# Patient Record
Sex: FEMALE | Race: WHITE | NOT HISPANIC OR LATINO | Employment: OTHER | URBAN - METROPOLITAN AREA
[De-identification: names, ages, dates, MRNs, and addresses within clinical notes are randomized per-mention and may not be internally consistent; named-entity substitution may affect disease eponyms.]

---

## 2017-01-17 ENCOUNTER — ANESTHESIA EVENT (OUTPATIENT)
Dept: GASTROENTEROLOGY | Facility: HOSPITAL | Age: 62
End: 2017-01-17

## 2017-01-17 ENCOUNTER — ANESTHESIA (OUTPATIENT)
Dept: GASTROENTEROLOGY | Facility: HOSPITAL | Age: 62
End: 2017-01-17

## 2017-01-17 PROCEDURE — 25010000002 PROPOFOL 10 MG/ML EMULSION: Performed by: ANESTHESIOLOGY

## 2017-01-17 RX ORDER — LIDOCAINE HYDROCHLORIDE 20 MG/ML
INJECTION, SOLUTION INFILTRATION; PERINEURAL AS NEEDED
Status: DISCONTINUED | OUTPATIENT
Start: 2017-01-17 | End: 2017-01-17 | Stop reason: SURG

## 2017-01-17 RX ORDER — PROPOFOL 10 MG/ML
VIAL (ML) INTRAVENOUS CONTINUOUS PRN
Status: DISCONTINUED | OUTPATIENT
Start: 2017-01-17 | End: 2017-01-17 | Stop reason: SURG

## 2017-01-17 RX ORDER — PROPOFOL 10 MG/ML
VIAL (ML) INTRAVENOUS AS NEEDED
Status: DISCONTINUED | OUTPATIENT
Start: 2017-01-17 | End: 2017-01-17 | Stop reason: SURG

## 2017-01-17 RX ADMIN — PROPOFOL 40 MG: 10 INJECTION, EMULSION INTRAVENOUS at 15:35

## 2017-01-17 RX ADMIN — PROPOFOL 140 MCG/KG/MIN: 10 INJECTION, EMULSION INTRAVENOUS at 15:27

## 2017-01-17 RX ADMIN — PROPOFOL 90 MG: 10 INJECTION, EMULSION INTRAVENOUS at 15:26

## 2017-01-17 RX ADMIN — LIDOCAINE HYDROCHLORIDE 60 MG: 20 INJECTION, SOLUTION INFILTRATION; PERINEURAL at 15:27

## 2017-01-17 RX ADMIN — PROPOFOL 30 MG: 10 INJECTION, EMULSION INTRAVENOUS at 15:28

## 2017-01-17 NOTE — ANESTHESIA PREPROCEDURE EVALUATION
Anesthesia Evaluation      Airway   Mallampati: II  TM distance: <3 FB  possible difficult intubation  Dental - normal exam     Pulmonary    (+) asthma,   Cardiovascular - negative cardio ROS and normal exam    Rhythm: regular  Rate: normal    Neuro/Psych- negative ROS  GI/Hepatic/Renal/Endo    (+)  hiatal hernia, GERD,     Musculoskeletal     Abdominal    Substance History      OB/GYN          Other                             Anesthesia Plan    ASA 3     MAC

## 2017-01-24 ENCOUNTER — TELEPHONE (OUTPATIENT)
Dept: GASTROENTEROLOGY | Facility: CLINIC | Age: 62
End: 2017-01-24

## 2017-01-24 NOTE — TELEPHONE ENCOUNTER
Call to pt.  Advise per path results that polyp that was removed was not cancerous and not precancerous.  Advise per Dr Warren that recommend f/u c/s in 5 yrs, office f/u sooner as needed.  Pt verb understanding    Message to Giselle STEIN for 1/18/22 c/s recall.

## 2017-01-24 NOTE — TELEPHONE ENCOUNTER
----- Message from Edgar LOBATO MD sent at 1/20/2017  1:06 PM EST -----  Regarding: Biopsy results  Okay to call results, recommend follow-up colonoscopy in 5 years, office follow up sooner as needed.  ----- Message -----     From: Lab, Background User     Sent: 1/19/2017   1:51 PM       To: Edgar LOBATO MD

## 2017-04-10 ENCOUNTER — OFFICE VISIT (OUTPATIENT)
Dept: FAMILY MEDICINE CLINIC | Facility: CLINIC | Age: 62
End: 2017-04-10

## 2017-04-10 VITALS
BODY MASS INDEX: 35.51 KG/M2 | DIASTOLIC BLOOD PRESSURE: 70 MMHG | WEIGHT: 193 LBS | RESPIRATION RATE: 16 BRPM | SYSTOLIC BLOOD PRESSURE: 110 MMHG | HEIGHT: 62 IN | OXYGEN SATURATION: 96 % | HEART RATE: 94 BPM

## 2017-04-10 DIAGNOSIS — M79.671 RIGHT FOOT PAIN: Primary | ICD-10-CM

## 2017-04-10 PROCEDURE — 99213 OFFICE O/P EST LOW 20 MIN: CPT | Performed by: FAMILY MEDICINE

## 2017-04-10 PROCEDURE — 73630 X-RAY EXAM OF FOOT: CPT | Performed by: FAMILY MEDICINE

## 2017-04-10 NOTE — PROGRESS NOTES
Subjective   Ignacia Tovar is a 61 y.o. female presenting with   Chief Complaint   Patient presents with   • Foot Pain     right foot pain  states it is the top and side of her right foot         HPI Comments: 61-year-old  white female nonsmoker says that she has had right foot pain for about a year but lately she has been working 9 hours per day 4 days a week and says that by the end of her shift she hurt so bad she can only go home and go to bed.  She went and bought the issue she could fine but says it doesn't really help much.  She denies any single incident of significant trauma in the past.       The following portions of the patient's history were reviewed and updated as appropriate: current medications, past family history, past medical history, past social history, past surgical history and problem list.    Review of Systems   Musculoskeletal: Positive for arthralgias and gait problem.   All other systems reviewed and are negative.      Objective   Physical Exam   Constitutional: She is oriented to person, place, and time. She appears well-developed and well-nourished.   HENT:   Head: Normocephalic and atraumatic.   Eyes: EOM are normal. Pupils are equal, round, and reactive to light.   Neck: Normal range of motion. Neck supple. No thyromegaly present.   Musculoskeletal: Normal range of motion. She exhibits tenderness. She exhibits no deformity.        Lymphadenopathy:     She has no cervical adenopathy.   Neurological: She is alert and oriented to person, place, and time. No cranial nerve deficit. Coordination normal.   Skin: Skin is warm and dry. She is not diaphoretic.   Psychiatric: She has a normal mood and affect. Her behavior is normal.   Nursing note and vitals reviewed.      Assessment/Plan   Ignacia was seen today for foot pain.    Diagnoses and all orders for this visit:    Right foot pain  -     XR Foot 3+ View Right (In Office)                   I would like him to return for another visit in  6 month(s)

## 2017-04-10 NOTE — PROGRESS NOTES
Procedure   Procedures        X Ray report    To further evaluate her complaint of significant chronic right foot pain I have requested an x-ray.  There are no old ones to compare this to.  2 out of 3 views of the foot appear to be normal, but one view appears to show a slightly displaced fracture of the proximal aspect of the third metatarsal.

## 2017-04-10 NOTE — PATIENT INSTRUCTIONS
This is a very nice 61-year-old with significant right foot pain.  2 out of 3 views of her foot looked normal, but one suggests there is a fracture of the proximal third metatarsal.  I will request a podiatry consult to further evaluate this.

## 2017-06-13 ENCOUNTER — OFFICE VISIT (OUTPATIENT)
Dept: OBSTETRICS AND GYNECOLOGY | Facility: CLINIC | Age: 62
End: 2017-06-13

## 2017-06-13 VITALS
WEIGHT: 197.2 LBS | DIASTOLIC BLOOD PRESSURE: 62 MMHG | HEIGHT: 62 IN | SYSTOLIC BLOOD PRESSURE: 130 MMHG | BODY MASS INDEX: 36.29 KG/M2

## 2017-06-13 DIAGNOSIS — Z13.820 SCREENING FOR OSTEOPOROSIS: ICD-10-CM

## 2017-06-13 DIAGNOSIS — Z01.411 ENCOUNTER FOR GYNECOLOGICAL EXAMINATION WITH ABNORMAL FINDING: ICD-10-CM

## 2017-06-13 DIAGNOSIS — N81.6 RECTOCELE: ICD-10-CM

## 2017-06-13 DIAGNOSIS — K46.9 ENTEROCELE: ICD-10-CM

## 2017-06-13 DIAGNOSIS — Z78.0 MENOPAUSE: Primary | ICD-10-CM

## 2017-06-13 PROCEDURE — 99386 PREV VISIT NEW AGE 40-64: CPT | Performed by: OBSTETRICS & GYNECOLOGY

## 2017-06-13 NOTE — PROGRESS NOTES
"   Chief Complaint   Patient presents with   • Gynecologic Exam       Ignacia Tovar is a 61 y.o. year old  presenting to be seen for a wellness exam as a new patient. This patient has not been seen in our office for years.  She had a vaginal hysterectomy unilateral salpingo-oophorectomy by Dr. Javed.  I did a unilateral salpingo-oophorectomy and laparoscopic Lyman procedure.  She has no urinary symptoms.  She has developed difficulty expelling stool and has rectal pressure.  She denies menopausal symptoms.    SCREENING TESTS    Year 2012 2016 2017 2018   Age                         PAP                         HPV high risk                         Mammogram      benign                   MEI score                         Breast MRI                         Lipids                         Vitamin D                         Colonoscopy                         DEXA  Frax (hip/any)      *                   Ovarian Screen                           Enter the month test was performed.  If month not known, enter \"X'  · Black numbers = normal results  · Red numbers = abnormal results  · Black X = patient reported normal  · Red X - patient reported abnormal      Referred by:    Profession:    Other info:        She exercises regularly: yes.  She wears her seat belt: yes.  She has concerns about domestic violence: no.  She has noticed changes in height: no    GYN screening history:  · Last mammogram: was done on approximately 2017 and the result was: Birads I (Normal)..    No Additional Complaints Reported    The following portions of the patient's history were reviewed and updated as appropriate:vital signs and   She  does not have any pertinent problems on file.  She  has a past surgical history that includes Back surgery; Bladder surgery; Breast surgery; Cholecystectomy; Tonsillectomy; Esophagus surgery (); Colonoscopy (N/A, " "1/17/2017); vaginal hysterectomy salpingo oophorectomy; Salpingoophorectomy; Laparoscopic Lyman procedure; and Rotator cuff repair (Right).  Her family history includes Bone cancer in her sister; Breast cancer in her sister; Colon cancer in her brother; Lung cancer in her brother and sister; Ovarian cancer in her sister.  She  reports that she has never smoked. She has never used smokeless tobacco. She reports that she drinks alcohol. She reports that she does not use illicit drugs.  No current outpatient prescriptions on file.     No current facility-administered medications for this visit.      She has No Known Allergies..    Review of Systems  A comprehensive review of systems was taken.  Constitutional: negative for fever, chills, activity change, appetite change, fatigue and unexpected weight change.  Respiratory: negative  Cardiovascular: negative  Gastrointestinal: positive for difficulty expelling stool and rectal pressure  Genitourinary:negative  Musculoskeletal:negative  Behavioral/Psych: negative       /62  Ht 62\" (157.5 cm)  Wt 197 lb 3.2 oz (89.4 kg)  LMP  (LMP Unknown)  BMI 36.07 kg/m2    Physical Exam    General:  alert; cooperative; well developed; well nourished  obese - Body mass index is 36.07 kg/(m^2).   Skin:  No suspicious lesions seen   Thyroid: normal to inspection and palpation   Lungs:  clear to auscultation bilaterally   Heart:  regular rate and rhythm, S1, S2 normal, no murmur, click, rub or gallop   Breasts:  Examined in supine position  Symmetric without masses or skin dimpling  Nipples normal without inversion, lesions or discharge  There are no palpable axillary nodes   Abdomen: soft, non-tender; no masses  no umbilical or inginual hernias are present  no hepato-splenomegaly   Pelvis: Clinical staff was present for exam  External genitalia:  normal appearance of the external genitalia including Bartholin's and [place]'s glands.  Vaginal:  normal pink mucosa without prolapse or " lesions.  Cervix:  absent.  Uterus:  absent.  Adnexa:  absent, bilateral.  Rectal:  anus visually normal appearing. recto-vaginal exam unremarkable and confirms findings;  Rectocele GRADE 2  Enterocele GRADE 2     Lab Review   No data reviewed    Imaging  Mammogram results- benign         ASSESSMENT  Problems Addressed this Visit        Digestive    Rectocele    Enterocele       Genitourinary    Menopause - Primary      Other Visit Diagnoses     Encounter for gynecological examination with abnormal finding        Relevant Orders    Liquid-based Pap Smear, Screening    Screening for osteoporosis        Relevant Orders    DEXA Bone Density Axial          PLAN    · Medications prescribed this encounter:  No orders of the defined types were placed in this encounter.  · Pap test of vagina done  · DEXA ordered  · I have had a 1 minute face-to-face discussion with this patient about her findings of a grade 2 rectocele and a grade 2 enterocele.  She is symptomatic.  I have counseled her about surgical repair with a posterior repair and enterocele repair.  She desires these procedures due to her symptoms.  She will make a decision for surgery and be scheduled as an outpatient.  The surgical risks of rectal injury, bowel injury, bleeding, infection, and anesthetic risks were explained to the patient.  She voiced understanding of these risks.  · Calcium, 600 mg/ Vit. D, 400 IU daily; regular weight-bearing exercise  · Follow up: 12 month(s)  *Please note that portions of this documentation may have been completed with a voice recognition program.  Efforts were made to edit this dictation, but occasional words may have been mistranscribed.       This note was electronically signed.    MICHELLE Meng MD  June 13, 2017  3:41 PM

## 2017-06-20 ENCOUNTER — TELEPHONE (OUTPATIENT)
Dept: OBSTETRICS AND GYNECOLOGY | Facility: CLINIC | Age: 62
End: 2017-06-20

## 2017-06-20 NOTE — TELEPHONE ENCOUNTER
Pt calling to reschedule her Posterior/Enterocele Repair at [facility] from 6/30 to 7/21/17. States her daughter has had knee surgery and she needs to be available to help with grandchildren for a bit longer.  We will talk soon to plan when she should see her PCP for Pre op labs and EKG. Deepika Harris in business office notified, [facility] ([place]) notified

## 2017-06-27 ENCOUNTER — HOSPITAL ENCOUNTER (OUTPATIENT)
Dept: BONE DENSITY | Facility: HOSPITAL | Age: 62
Discharge: HOME | End: 2017-06-27
Attending: OBSTETRICS & GYNECOLOGY | Admitting: OBSTETRICS & GYNECOLOGY

## 2017-06-27 DIAGNOSIS — Z13.820 SCREENING FOR OSTEOPOROSIS: ICD-10-CM

## 2017-06-27 PROCEDURE — 77080 DXA BONE DENSITY AXIAL: CPT

## 2017-06-27 PROCEDURE — 77080 DXA BONE DENSITY AXIAL: CPT | Performed by: RADIOLOGY

## 2017-07-11 ENCOUNTER — TELEPHONE (OUTPATIENT)
Dept: OBSTETRICS AND GYNECOLOGY | Facility: CLINIC | Age: 62
End: 2017-07-11

## 2017-07-13 ENCOUNTER — DOCUMENTATION (OUTPATIENT)
Dept: OBSTETRICS AND GYNECOLOGY | Facility: CLINIC | Age: 62
End: 2017-07-13

## 2017-07-13 ENCOUNTER — TELEPHONE (OUTPATIENT)
Dept: OBSTETRICS AND GYNECOLOGY | Facility: CLINIC | Age: 62
End: 2017-07-13

## 2017-07-13 NOTE — PROGRESS NOTES
Chief Complaint: Difficulty expelling stool  History of Present Illness: This patient was seen in the office on 2017 for a wellness examination with complaints of difficulty expelling stool.  She has previously had a vaginal hysterectomy unilateral salpingo-oophorectomy by Dr. Javed and a unilateral salpingo-oophorectomy and laparoscopic Lyman procedure by myself.  On examination she was found to have a grade 2 rectocele and a grade 2 enterocele.  She desires treatment of these defects with a posterior repair and enterocele repair to attempt to improve her difficulty expelling stool.  She has no incontinence of stool.  She has had no prior bowel surgery.  Ignacia Tovar is a 62 y.o., [id number]    Past Medical History:   Diagnosis Date   • Asthma    • Celiac disease    • Esophageal reflux    • Hiatal hernia    • Hyperlipidemia    • Prediabetes        Allergies: NKDA    Medications: None    Previous Surgery: 1) Diskectomy; 2) Cystoscopy; 3) T&A; 4) Cholecystectomy; 5) Breast biopsy; 6) VH, S&O; 7) Laparoscopic S&O, Lyman procedure; 8) Right rotator cuff surgery    Nicotine: None; Alcohol: Social; Drugs: None    Review of Systems  A comprehensive review of systems was negative.  Constitutional: negative for fever, chills, activity change, appetite change, fatigue and unexpected weight change.  Respiratory: negative  Cardiovascular: negative  Gastrointestinal: positive for difficulty expelling stool  Genitourinary:negative  Musculoskeletal:negative  Behavioral/Psych: negative      LMP  (LMP Unknown)    Physical Exam  General:  alert; cooperative; well developed; well nourished   Skin:  No suspicious lesions seen   Thyroid: normal to inspection and palpation   Lungs:  clear to auscultation bilaterally   Heart:  regular rate and rhythm, S1, S2 normal, no murmur, click, rub or gallop   Breasts:  Examined in supine position  Symmetric without masses or skin dimpling  Nipples normal without inversion, lesions or  discharge  There are no palpable axillary nodes   Abdomen: soft, non-tender; no masses  no umbilical or inginual hernias are present  no hepato-splenomegaly   Pelvis: Clinical staff was present for exam  External genitalia:  normal appearance of the external genitalia including Bartholin's and Day's glands.  Vaginal:  normal pink mucosa without prolapse or lesions.  Cervix:  absent.  Uterus:  absent.  Adnexa:  absent, bilateral.  Rectal:  anus visually normal appearing. recto-vaginal exam unremarkable and confirms findings;  Rectocele GRADE 2  Enterocele GRADE 2         Injury to bowel, Injury to rectum, Injury to bladder, Injury to ureter, bleeding, infection and anesthestic risks were explained to the patient and she voiced understanding. Informed consent was signed.    No contraindications to planned surgery were detected      Impression: 1) Rectocele grade II; (N81.6) 2) Enterocele grade II, (N81.5)        Plan: Posterior Repair, Enterocele Repair      *Please note that portions of this documentation may have been completed with a voice recognition program.  Efforts were made to edit this dictation, but occasional words may have been mistranscribed.  This note was electronically signed.    MICHELLE Meng MD  July 13, 2017  9:05 AM

## 2017-07-13 NOTE — TELEPHONE ENCOUNTER
Another attempt to reach pt to discuss Pre op bloodwork and bowel prep. Have left another msg for return call.

## 2017-07-17 ENCOUNTER — TELEPHONE (OUTPATIENT)
Dept: OBSTETRICS AND GYNECOLOGY | Facility: CLINIC | Age: 62
End: 2017-07-17

## 2017-07-17 NOTE — TELEPHONE ENCOUNTER
Pt returning my calls from last week. She states that she will need to cancel her Posterior Repair scheduled for 7/21 at [facility]. States her daughter has had complications with previous knee surgery and Hilary will be caring for her grandchildren until issues are resolved. She will call back later to reschedule.  Notified Deepika PIERCE in business office and [facility] ([place]).

## 2018-04-06 ENCOUNTER — OFFICE VISIT (OUTPATIENT)
Dept: FAMILY MEDICINE CLINIC | Facility: CLINIC | Age: 63
End: 2018-04-06

## 2018-04-06 VITALS
WEIGHT: 196 LBS | SYSTOLIC BLOOD PRESSURE: 122 MMHG | HEART RATE: 73 BPM | TEMPERATURE: 97.6 F | OXYGEN SATURATION: 98 % | DIASTOLIC BLOOD PRESSURE: 82 MMHG | BODY MASS INDEX: 35.85 KG/M2

## 2018-04-06 DIAGNOSIS — J40 BRONCHITIS: Primary | ICD-10-CM

## 2018-04-06 DIAGNOSIS — J01.00 ACUTE NON-RECURRENT MAXILLARY SINUSITIS: ICD-10-CM

## 2018-04-06 PROCEDURE — 99213 OFFICE O/P EST LOW 20 MIN: CPT | Performed by: FAMILY MEDICINE

## 2018-04-06 RX ORDER — AMOXICILLIN AND CLAVULANATE POTASSIUM 875; 125 MG/1; MG/1
1 TABLET, FILM COATED ORAL 2 TIMES DAILY
Qty: 14 TABLET | Refills: 0 | Status: SHIPPED | OUTPATIENT
Start: 2018-04-06 | End: 2018-06-01

## 2018-04-06 RX ORDER — CETIRIZINE HYDROCHLORIDE 10 MG/1
10 TABLET ORAL DAILY
COMMUNITY
End: 2021-01-14

## 2018-04-06 NOTE — PROGRESS NOTES
Subjective   Ignacia Tovar is a 62 y.o. female presenting with   Chief Complaint   Patient presents with   • possible pneumonia        This is a 62-year-old  white female nonsmoker who comes in today with the complaint that for the last week she has had right posterior chest pain that is very reminiscent of when she had pneumonia.  She does not have any leg swelling or hemoptysis or shortness of breath.  She also does not have any high fever or chills.    She says since the onset of this pain she has also developed a cough with some sinus pressure and rhinorrhea.  The cough is nonproductive and she does not have a severe headache or high fever or chills.         The following portions of the patient's history were reviewed and updated as appropriate: current medications, past family history, past medical history, past social history, past surgical history and problem list.    Review of Systems   HENT: Positive for congestion, postnasal drip and rhinorrhea.    Respiratory: Positive for cough.    All other systems reviewed and are negative.      Objective   Physical Exam   Constitutional: She is oriented to person, place, and time. She appears well-developed and well-nourished. No distress.   HENT:   Head: Normocephalic and atraumatic.   Right Ear: External ear normal.   Left Ear: External ear normal.   Nose: Mucosal edema and rhinorrhea present. Right sinus exhibits maxillary sinus tenderness. Left sinus exhibits maxillary sinus tenderness.   Mouth/Throat: Oropharynx is clear and moist.   Eyes: EOM are normal. Pupils are equal, round, and reactive to light.   Neck: Normal range of motion. Neck supple. No thyromegaly present.   Cardiovascular: Normal rate and regular rhythm.    Pulmonary/Chest: Effort normal. No respiratory distress. She has no wheezes. She has rhonchi in the right upper field and the left upper field. She has no rales.   Musculoskeletal: Normal range of motion. She exhibits no edema or  tenderness.   Lymphadenopathy:     She has no cervical adenopathy.   Neurological: She is alert and oriented to person, place, and time.   Skin: Skin is warm and dry. She is not diaphoretic.   Psychiatric: She has a normal mood and affect. Her behavior is normal.   Nursing note and vitals reviewed.      Assessment/Plan   Ada was seen today for possible pneumonia.    Diagnoses and all orders for this visit:    Bronchitis    Acute non-recurrent maxillary sinusitis    Other orders  -     amoxicillin-clavulanate (AUGMENTIN) 875-125 MG per tablet; Take 1 tablet by mouth 2 (Two) Times a Day.                   I would like him to return for another visit in 6 month(s)

## 2018-04-06 NOTE — PATIENT INSTRUCTIONS
This is a very nice 62-year-old with acute sinusitis and bronchitis.  I will send out Augmentin and would like her to continue her Zyrtec and Mucinex.

## 2018-06-01 ENCOUNTER — OFFICE VISIT (OUTPATIENT)
Dept: FAMILY MEDICINE CLINIC | Facility: CLINIC | Age: 63
End: 2018-06-01

## 2018-06-01 VITALS
WEIGHT: 191.7 LBS | DIASTOLIC BLOOD PRESSURE: 83 MMHG | SYSTOLIC BLOOD PRESSURE: 117 MMHG | TEMPERATURE: 97.7 F | BODY MASS INDEX: 35.28 KG/M2 | HEART RATE: 56 BPM | HEIGHT: 62 IN | OXYGEN SATURATION: 95 %

## 2018-06-01 DIAGNOSIS — J40 BRONCHITIS: Primary | ICD-10-CM

## 2018-06-01 PROCEDURE — 99213 OFFICE O/P EST LOW 20 MIN: CPT | Performed by: FAMILY MEDICINE

## 2018-06-01 RX ORDER — AMOXICILLIN AND CLAVULANATE POTASSIUM 875; 125 MG/1; MG/1
1 TABLET, FILM COATED ORAL 2 TIMES DAILY
Qty: 14 TABLET | Refills: 0 | Status: SHIPPED | OUTPATIENT
Start: 2018-06-01 | End: 2019-02-06

## 2018-06-01 NOTE — PROGRESS NOTES
Subjective   Ignacia Tovar is a 62 y.o. female presenting with   Chief Complaint   Patient presents with   • URI     possible bronchitis        She played out in the rain 5 days ago with her grand children and started getting sick.  She says now she is continuously coughing and it is productive of yellowish sputum.  She does not have any chest pain or shortness of breath.  She is not wheezing.  She does have some sinus drainage as well.  None of the grandchildren are sick.      URI    Associated symptoms include congestion, coughing and rhinorrhea.        The following portions of the patient's history were reviewed and updated as appropriate: current medications, past family history, past medical history, past social history, past surgical history and problem list.    Review of Systems   HENT: Positive for congestion and rhinorrhea.    Respiratory: Positive for cough.    All other systems reviewed and are negative.      Objective   Physical Exam   Constitutional: She is oriented to person, place, and time. She appears well-developed and well-nourished. No distress.   HENT:   Head: Normocephalic and atraumatic.   Nose: Mucosal edema and rhinorrhea present.   Eyes: EOM are normal. Pupils are equal, round, and reactive to light.   Neck: Normal range of motion. Neck supple. No thyromegaly present.   Cardiovascular: Normal rate and regular rhythm.    Pulmonary/Chest: Effort normal. No respiratory distress. She has no wheezes. She has rhonchi in the right upper field and the left upper field. She has no rales.   Musculoskeletal: Normal range of motion. She exhibits no edema or tenderness.   Lymphadenopathy:     She has no cervical adenopathy.   Neurological: She is alert and oriented to person, place, and time.   Skin: Skin is warm and dry. She is not diaphoretic.   Psychiatric: She has a normal mood and affect. Her behavior is normal.   Nursing note and vitals reviewed.      Assessment/Plan   Ignacia was seen today for  joe.    Diagnoses and all orders for this visit:    Bronchitis    Other orders  -     amoxicillin-clavulanate (AUGMENTIN) 875-125 MG per tablet; Take 1 tablet by mouth 2 (Two) Times a Day.                   I would like him to return for another visit in 6 month(s)

## 2018-06-01 NOTE — PATIENT INSTRUCTIONS
This is a very nice 62-year-old who is here for deep cough and chest congestion.  I would like her to call Monday if she is not starting to feel better.

## 2019-02-06 ENCOUNTER — OFFICE VISIT (OUTPATIENT)
Dept: FAMILY MEDICINE CLINIC | Facility: CLINIC | Age: 64
End: 2019-02-06

## 2019-02-06 VITALS
TEMPERATURE: 98.3 F | BODY MASS INDEX: 32.52 KG/M2 | DIASTOLIC BLOOD PRESSURE: 78 MMHG | SYSTOLIC BLOOD PRESSURE: 139 MMHG | WEIGHT: 183.6 LBS | HEART RATE: 89 BPM | OXYGEN SATURATION: 95 %

## 2019-02-06 DIAGNOSIS — J01.10 ACUTE FRONTAL SINUSITIS, RECURRENCE NOT SPECIFIED: Primary | ICD-10-CM

## 2019-02-06 PROCEDURE — 99214 OFFICE O/P EST MOD 30 MIN: CPT | Performed by: NURSE PRACTITIONER

## 2019-02-06 RX ORDER — AMOXICILLIN AND CLAVULANATE POTASSIUM 875; 125 MG/1; MG/1
1 TABLET, FILM COATED ORAL 2 TIMES DAILY
Qty: 14 TABLET | Refills: 0 | Status: SHIPPED | OUTPATIENT
Start: 2019-02-06 | End: 2021-01-14

## 2019-02-06 RX ORDER — BENZONATATE 200 MG/1
200 CAPSULE ORAL 2 TIMES DAILY PRN
Qty: 30 CAPSULE | Refills: 0 | Status: SHIPPED | OUTPATIENT
Start: 2019-02-06 | End: 2021-01-14

## 2019-02-06 NOTE — PROGRESS NOTES
Subjective   Ignacia Tovar is a 63 y.o. female.     Chief Complaint   Patient presents with   • Sinus Problem     green drainage is taking musinex   • Cough      HPI  Patient is new to me.  She is here for sinus pain and pressure b/l forehead since this past Saturday which is worsening since yesterday.  Headache associated across her forehead is worsening and has not been relieved with OTC meds.  Has cough for same duration and sometimes is able to bring up some green sputum.  Has been taking mucinex but doesn't seem to be helping after initial improvement.  Cough has been dry at night and so frequent that she did not get much sleep last night. Usually gets sinusitis that gets so bad she eventually gets pneumonia unless it is treated early with abx.  Also, has some questions about weight loss.  She wants info on Contrave which her nephew with diabetes is taking and is now no longer diabetic.  She wonders if she needs to be on this to prevent diabetes.  Walks frequently in her job and always gets at least 10,000 steps in.      Social History     Tobacco Use   • Smoking status: Never Smoker   • Smokeless tobacco: Never Used   Substance Use Topics   • Alcohol use: Yes     Comment: social   • Drug use: No       The following portions of the patient's history were reviewed and updated as appropriate: allergies, current medications, past family history, past medical history, past social history, past surgical history and problem list.    Review of Systems   Constitutional: Positive for fatigue (just started in last few days). Negative for chills and fever.   HENT: Positive for congestion, ear pain (right ear feels full), sinus pressure and sinus pain. Negative for facial swelling, postnasal drip, rhinorrhea and sore throat.    Eyes: Negative for itching.   Respiratory: Positive for cough. Negative for chest tightness, shortness of breath and wheezing.    Cardiovascular: Negative for chest pain and palpitations.    Gastrointestinal: Negative for abdominal pain, diarrhea, nausea and vomiting.   Musculoskeletal: Negative for arthralgias and myalgias.   Skin: Negative for rash.   Allergic/Immunologic: Positive for environmental allergies (controlled with zyrtec).   Neurological: Negative for dizziness and weakness.       Objective   Blood pressure 139/78, pulse 89, temperature 98.3 °F (36.8 °C), weight 83.3 kg (183 lb 9.6 oz), SpO2 95 %, not currently breastfeeding.    Physical Exam   Constitutional: She is oriented to person, place, and time. She appears well-developed and well-nourished.   HENT:   Head: Normocephalic and atraumatic.   Right Ear: External ear and ear canal normal. Tympanic membrane is not erythematous. A middle ear effusion is present.   Left Ear: External ear and ear canal normal. Tympanic membrane is not erythematous.   Nose: Mucosal edema and rhinorrhea present. Right sinus exhibits frontal sinus tenderness. Left sinus exhibits frontal sinus tenderness.   Mouth/Throat: Oropharynx is clear and moist. No oropharyngeal exudate.   Eyes: Conjunctivae are normal. Right eye exhibits no discharge. Left eye exhibits no discharge.   Neck: Neck supple. No tracheal deviation present.   Cardiovascular: Normal rate, regular rhythm, normal heart sounds and intact distal pulses.   Pulmonary/Chest: Effort normal and breath sounds normal. She has no wheezes. She has no rales.   Abdominal: Soft. Bowel sounds are normal. There is no tenderness.   Musculoskeletal: She exhibits no deformity.   Gait smooth and steady   Lymphadenopathy:     She has no cervical adenopathy.   Neurological: She is alert and oriented to person, place, and time. No cranial nerve deficit.   Skin: Skin is warm and dry. No rash noted.   Psychiatric: She has a normal mood and affect.   Nursing note and vitals reviewed.      Assessment   Problem List Items Addressed This Visit     None      Visit Diagnoses     Acute frontal sinusitis, recurrence not  specified    -  Primary    BMI 32.0-32.9,adult               Procedures           Impression and Plan:  Augmentin for sinusitis.  Tessalon for cough.  Use flonase BID x 1 week, then daily. Continue zyrtec.   BMI:  We reviewed her chart and it looks like she is doing a great job on weight loss.  She has been slowly and steadily losing weight.  I recommend she continue getting her 10,000 steps daily and follow a sensible diet as she has been doing.  I dont think she needs a weight loss medication at this time and pt is agreeable with this.        Health Maintenance Due   Topic Date Due   • ANNUAL PHYSICAL  06/14/1958   • TDAP/TD VACCINES (1 - Tdap) 06/14/1974   • ZOSTER VACCINE (1 of 2) 06/14/2005   • HEPATITIS C SCREENING  08/11/2016   • LIPID PANEL  08/11/2016   • INFLUENZA VACCINE  08/01/2018

## 2021-03-19 ENCOUNTER — BULK ORDERING (OUTPATIENT)
Dept: CASE MANAGEMENT | Facility: OTHER | Age: 66
End: 2021-03-19

## 2021-03-19 DIAGNOSIS — Z23 IMMUNIZATION DUE: ICD-10-CM

## 2021-04-14 ENCOUNTER — APPOINTMENT (OUTPATIENT)
Dept: GENERAL RADIOLOGY | Facility: HOSPITAL | Age: 66
End: 2021-04-14

## 2021-04-14 ENCOUNTER — HOSPITAL ENCOUNTER (EMERGENCY)
Facility: HOSPITAL | Age: 66
Discharge: HOME | End: 2021-04-14
Attending: EMERGENCY MEDICINE | Admitting: EMERGENCY MEDICINE

## 2021-04-14 VITALS
OXYGEN SATURATION: 100 % | HEIGHT: 62 IN | SYSTOLIC BLOOD PRESSURE: 133 MMHG | BODY MASS INDEX: 34.6 KG/M2 | HEART RATE: 88 BPM | DIASTOLIC BLOOD PRESSURE: 77 MMHG | RESPIRATION RATE: 18 BRPM | TEMPERATURE: 96.3 F | WEIGHT: 188 LBS

## 2021-04-14 DIAGNOSIS — U07.1 COVID-19: Primary | ICD-10-CM

## 2021-04-14 LAB
ALBUMIN SERPL-MCNC: 3.6 G/DL (ref 3.5–5.2)
ALBUMIN/GLOB SERPL: 1.4 G/DL
ALP SERPL-CCNC: 65 U/L (ref 39–117)
ALT SERPL W P-5'-P-CCNC: 23 U/L (ref 1–33)
ANION GAP SERPL CALCULATED.3IONS-SCNC: 9.6 MMOL/L (ref 5–15)
AST SERPL-CCNC: 14 U/L (ref 1–32)
B PARAPERT DNA SPEC QL NAA+PROBE: NOT DETECTED
B PERT DNA SPEC QL NAA+PROBE: NOT DETECTED
BASOPHILS # BLD AUTO: 0.08 10*3/MM3 (ref 0–0.2)
BASOPHILS NFR BLD AUTO: 0.8 % (ref 0–1.5)
BILIRUB SERPL-MCNC: <0.2 MG/DL (ref 0–1.2)
BUN SERPL-MCNC: 21 MG/DL (ref 8–23)
BUN/CREAT SERPL: 28.4 (ref 7–25)
C PNEUM DNA NPH QL NAA+NON-PROBE: NOT DETECTED
CALCIUM SPEC-SCNC: 8.4 MG/DL (ref 8.6–10.5)
CHLORIDE SERPL-SCNC: 100 MMOL/L (ref 98–107)
CO2 SERPL-SCNC: 27.4 MMOL/L (ref 22–29)
CREAT SERPL-MCNC: 0.74 MG/DL (ref 0.57–1)
DEPRECATED RDW RBC AUTO: 42.3 FL (ref 37–54)
EOSINOPHIL # BLD AUTO: 0.24 10*3/MM3 (ref 0–0.4)
EOSINOPHIL NFR BLD AUTO: 2.3 % (ref 0.3–6.2)
ERYTHROCYTE [DISTWIDTH] IN BLOOD BY AUTOMATED COUNT: 14.9 % (ref 12.3–15.4)
FLUAV SUBTYP SPEC NAA+PROBE: NOT DETECTED
FLUBV RNA ISLT QL NAA+PROBE: NOT DETECTED
GFR SERPL CREATININE-BSD FRML MDRD: 79 ML/MIN/1.73
GLOBULIN UR ELPH-MCNC: 2.6 GM/DL
GLUCOSE SERPL-MCNC: 127 MG/DL (ref 65–99)
HADV DNA SPEC NAA+PROBE: NOT DETECTED
HCOV 229E RNA SPEC QL NAA+PROBE: NOT DETECTED
HCOV HKU1 RNA SPEC QL NAA+PROBE: NOT DETECTED
HCOV NL63 RNA SPEC QL NAA+PROBE: NOT DETECTED
HCOV OC43 RNA SPEC QL NAA+PROBE: NOT DETECTED
HCT VFR BLD AUTO: 40.7 % (ref 34–46.6)
HGB BLD-MCNC: 12.9 G/DL (ref 12–15.9)
HMPV RNA NPH QL NAA+NON-PROBE: NOT DETECTED
HOLD SPECIMEN: NORMAL
HOLD SPECIMEN: NORMAL
HPIV1 RNA SPEC QL NAA+PROBE: NOT DETECTED
HPIV2 RNA SPEC QL NAA+PROBE: NOT DETECTED
HPIV3 RNA NPH QL NAA+PROBE: NOT DETECTED
HPIV4 P GENE NPH QL NAA+PROBE: NOT DETECTED
IMM GRANULOCYTES # BLD AUTO: 0.06 10*3/MM3 (ref 0–0.05)
IMM GRANULOCYTES NFR BLD AUTO: 0.6 % (ref 0–0.5)
LYMPHOCYTES # BLD AUTO: 2.96 10*3/MM3 (ref 0.7–3.1)
LYMPHOCYTES NFR BLD AUTO: 28 % (ref 19.6–45.3)
M PNEUMO IGG SER IA-ACNC: NOT DETECTED
MCH RBC QN AUTO: 24.9 PG (ref 26.6–33)
MCHC RBC AUTO-ENTMCNC: 31.7 G/DL (ref 31.5–35.7)
MCV RBC AUTO: 78.4 FL (ref 79–97)
MONOCYTES # BLD AUTO: 0.84 10*3/MM3 (ref 0.1–0.9)
MONOCYTES NFR BLD AUTO: 7.9 % (ref 5–12)
NEUTROPHILS NFR BLD AUTO: 6.4 10*3/MM3 (ref 1.7–7)
NEUTROPHILS NFR BLD AUTO: 60.4 % (ref 42.7–76)
NRBC BLD AUTO-RTO: 0 /100 WBC (ref 0–0.2)
NT-PROBNP SERPL-MCNC: 52.4 PG/ML (ref 0–900)
PLATELET # BLD AUTO: 534 10*3/MM3 (ref 140–450)
PMV BLD AUTO: 9.5 FL (ref 6–12)
POTASSIUM SERPL-SCNC: 4.3 MMOL/L (ref 3.5–5.2)
PROT SERPL-MCNC: 6.2 G/DL (ref 6–8.5)
QT INTERVAL: 430 MS
RBC # BLD AUTO: 5.19 10*6/MM3 (ref 3.77–5.28)
RHINOVIRUS RNA SPEC NAA+PROBE: NOT DETECTED
RSV RNA NPH QL NAA+NON-PROBE: NOT DETECTED
SARS-COV-2 RNA NPH QL NAA+NON-PROBE: DETECTED
SODIUM SERPL-SCNC: 137 MMOL/L (ref 136–145)
TROPONIN T SERPL-MCNC: <0.01 NG/ML (ref 0–0.03)
WBC # BLD AUTO: 10.58 10*3/MM3 (ref 3.4–10.8)
WHOLE BLOOD HOLD SPECIMEN: NORMAL
WHOLE BLOOD HOLD SPECIMEN: NORMAL

## 2021-04-14 PROCEDURE — 71045 X-RAY EXAM CHEST 1 VIEW: CPT

## 2021-04-14 PROCEDURE — 93005 ELECTROCARDIOGRAM TRACING: CPT

## 2021-04-14 PROCEDURE — 93005 ELECTROCARDIOGRAM TRACING: CPT | Performed by: EMERGENCY MEDICINE

## 2021-04-14 PROCEDURE — 85025 COMPLETE CBC W/AUTO DIFF WBC: CPT

## 2021-04-14 PROCEDURE — 93010 ELECTROCARDIOGRAM REPORT: CPT | Performed by: INTERNAL MEDICINE

## 2021-04-14 PROCEDURE — 80053 COMPREHEN METABOLIC PANEL: CPT | Performed by: EMERGENCY MEDICINE

## 2021-04-14 PROCEDURE — 0202U NFCT DS 22 TRGT SARS-COV-2: CPT | Performed by: EMERGENCY MEDICINE

## 2021-04-14 PROCEDURE — 84484 ASSAY OF TROPONIN QUANT: CPT

## 2021-04-14 PROCEDURE — 99284 EMERGENCY DEPT VISIT MOD MDM: CPT

## 2021-04-14 PROCEDURE — 83880 ASSAY OF NATRIURETIC PEPTIDE: CPT

## 2021-04-14 RX ORDER — SODIUM CHLORIDE 0.9 % (FLUSH) 0.9 %
10 SYRINGE (ML) INJECTION AS NEEDED
Status: DISCONTINUED | OUTPATIENT
Start: 2021-04-14 | End: 2021-04-14 | Stop reason: HOSPADM

## 2021-04-14 RX ADMIN — SODIUM CHLORIDE 500 ML: 9 INJECTION, SOLUTION INTRAVENOUS at 17:46

## 2021-04-14 NOTE — ED NOTES
I wore a gown, N95 mask, gloves and protective eyewear throughout the encounter. Pt was in a mask throughout our encounter. Hand hygiene was preformed before and after entering room      Deonte Nj, PCT  04/14/21 1712

## 2021-04-14 NOTE — DISCHARGE INSTRUCTIONS
Go home and rest. Return to the emergency room for oxygen saturations less than 90%. Call your doctor to inform him that you have Covid and for any further questions.

## 2021-04-14 NOTE — ED PROVIDER NOTES
EMERGENCY DEPARTMENT ENCOUNTER    Room Number:  28/28  Date of encounter:  4/14/2021  PCP: Ottoniel Boyd MD  Historian: Patient      HPI:  Chief Complaint: Covid exposure      Context: Ignacia Tovar is a 65 y.o. female who presents to the ED c/o not feeling well for over 2 weeks.  The patient states she has a history of asthma and celiac disease and received her first Covid vaccine on March 24.  She states she has had diarrhea since that time.  Her  currently has Covid pneumonia and is admitted to the hospital and she has now developed fevers, chills, cough and shortness of breath.  Patient called her PCP who sent the patient to the [facility] who then referred the patient to the ER for further evaluation and care.      PAST MEDICAL HISTORY  Active Ambulatory Problems     Diagnosis Date Noted   • Bronchitis 08/11/2016   • Fatigue 08/19/2016   • Prediabetes    • Hyperlipidemia    • Hiatal hernia    • Esophageal reflux    • Celiac disease    • Asthma    • Menopause 06/13/2017   • Rectocele 06/13/2017   • Enterocele 06/13/2017     Resolved Ambulatory Problems     Diagnosis Date Noted   • No Resolved Ambulatory Problems     No Additional Past Medical History         PAST SURGICAL HISTORY  Past Surgical History:   Procedure Laterality Date   • BACK SURGERY     • BLADDER SURGERY     • BREAST SURGERY     • CHOLECYSTECTOMY     • COLONOSCOPY N/A 1/17/2017    Procedure: COLONOSCOPY TO CECUM/TI WITH POLYPECTOMY (COLD BX);  Surgeon: Edgar LOBATO MD;  Location: [facility] ENDOSCOPY;  Service:    • ESOPHAGUS SURGERY  2008   • LAPAROSCOPIC BAUTISTA PROCEDURE     • ROTATOR CUFF REPAIR Right    • SALPINGO OOPHORECTOMY      unilateral   • TONSILLECTOMY     • VAGINAL HYSTERECTOMY SALPINGO OOPHORECTOMY      unilateral S&O         FAMILY HISTORY  Family History   Problem Relation Age of Onset   • Lung cancer Sister    • Lung cancer Brother    • Breast cancer Sister    • Ovarian cancer Sister    • Colon cancer Brother    • Bone  cancer Sister          SOCIAL HISTORY  Social History     Socioeconomic History   • Marital status:      Spouse name: Not on file   • Number of children: Not on file   • Years of education: Not on file   • Highest education level: Not on file   Tobacco Use   • Smoking status: Never Smoker   • Smokeless tobacco: Never Used   Vaping Use   • Vaping Use: Never used   Substance and Sexual Activity   • Alcohol use: Yes     Comment: social   • Drug use: No   • Sexual activity: Never     Birth control/protection: Abstinence         ALLERGIES  Patient has no known allergies.        REVIEW OF SYSTEMS  Review of Systems     Patient denies headache, sore throat, chest pain, abdominal pain, vomiting, lower extremity pain, lower extremity swelling, rash or focal neuro deficit    All systems reviewed and negative except for those discussed in HPI.     PHYSICAL EXAM    I have reviewed the triage vital signs and nursing notes.    ED Triage Vitals   Temp Heart Rate Resp BP SpO2   04/14/21 1643 04/14/21 1643 04/14/21 1643 04/14/21 1651 04/14/21 1643   96.3 °F (35.7 °C) 88 18 128/66 97 %      Temp src Heart Rate Source Patient Position BP Location FiO2 (%)   -- -- -- -- --              GENERAL: 65-year-old well developed, well nourished in no acute distress speaking in full sentences  HENT: NCAT, neck supple, trachea midline  EYES: no scleral icterus, PERRL, normal conjunctiva  CV: regular rhythm, regular rate, no murmur  RESPIRATORY: unlabored effort, CTAB  ABDOMEN: soft, non-tender, non-distended, bowel sounds present  MUSCULOSKELETAL: no gross deformity, no pedal edema, no calf tenderness  NEURO: alert,  sensory and motor function of extremities grossly intact, speech clear, mental status normal  SKIN: warm, dry, no rash  PSYCH:  Appropriate mood and affect      PPE  Patient was placed in face mask in first look. Patient was wearing facemask when I entered the room and throughout our encounter. I wore full protective  equipment throughout this patient encounter including a N95 face mask, eye shield, gown and gloves. Hand hygiene was performed before donning protective equipment and after removal when leaving the room.    Vital signs and nursing notes reviewed.      LAB RESULTS  Recent Results (from the past 24 hour(s))   Comprehensive Metabolic Panel    Collection Time: 04/14/21  4:57 PM    Specimen: Blood   Result Value Ref Range    Glucose 127 (H) 65 - 99 mg/dL    BUN 21 8 - 23 mg/dL    Creatinine 0.74 0.57 - 1.00 mg/dL    Sodium 137 136 - 145 mmol/L    Potassium 4.3 3.5 - 5.2 mmol/L    Chloride 100 98 - 107 mmol/L    CO2 27.4 22.0 - 29.0 mmol/L    Calcium 8.4 (L) 8.6 - 10.5 mg/dL    Total Protein 6.2 6.0 - 8.5 g/dL    Albumin 3.60 3.50 - 5.20 g/dL    ALT (SGPT) 23 1 - 33 U/L    AST (SGOT) 14 1 - 32 U/L    Alkaline Phosphatase 65 39 - 117 U/L    Total Bilirubin <0.2 0.0 - 1.2 mg/dL    eGFR Non African Amer 79 >60 mL/min/1.73    Globulin 2.6 gm/dL    A/G Ratio 1.4 g/dL    BUN/Creatinine Ratio 28.4 (H) 7.0 - 25.0    Anion Gap 9.6 5.0 - 15.0 mmol/L   BNP    Collection Time: 04/14/21  4:57 PM    Specimen: Blood   Result Value Ref Range    proBNP 52.4 0.0 - 900.0 pg/mL   Troponin    Collection Time: 04/14/21  4:57 PM    Specimen: Blood   Result Value Ref Range    Troponin T <0.010 0.000 - 0.030 ng/mL   Light Blue Top    Collection Time: 04/14/21  4:57 PM   Result Value Ref Range    Extra Tube hold for add-on    Green Top (Gel)    Collection Time: 04/14/21  4:57 PM   Result Value Ref Range    Extra Tube Hold for add-ons.    Lavender Top    Collection Time: 04/14/21  4:57 PM   Result Value Ref Range    Extra Tube hold for add-on    Gold Top - SST    Collection Time: 04/14/21  4:57 PM   Result Value Ref Range    Extra Tube Hold for add-ons.    CBC Auto Differential    Collection Time: 04/14/21  4:57 PM    Specimen: Blood   Result Value Ref Range    WBC 10.58 3.40 - 10.80 10*3/mm3    RBC 5.19 3.77 - 5.28 10*6/mm3    Hemoglobin 12.9 12.0  - 15.9 g/dL    Hematocrit 40.7 34.0 - 46.6 %    MCV 78.4 (L) 79.0 - 97.0 fL    MCH 24.9 (L) 26.6 - 33.0 pg    MCHC 31.7 31.5 - 35.7 g/dL    RDW 14.9 12.3 - 15.4 %    RDW-SD 42.3 37.0 - 54.0 fl    MPV 9.5 6.0 - 12.0 fL    Platelets 534 (H) 140 - 450 10*3/mm3    Neutrophil % 60.4 42.7 - 76.0 %    Lymphocyte % 28.0 19.6 - 45.3 %    Monocyte % 7.9 5.0 - 12.0 %    Eosinophil % 2.3 0.3 - 6.2 %    Basophil % 0.8 0.0 - 1.5 %    Immature Grans % 0.6 (H) 0.0 - 0.5 %    Neutrophils, Absolute 6.40 1.70 - 7.00 10*3/mm3    Lymphocytes, Absolute 2.96 0.70 - 3.10 10*3/mm3    Monocytes, Absolute 0.84 0.10 - 0.90 10*3/mm3    Eosinophils, Absolute 0.24 0.00 - 0.40 10*3/mm3    Basophils, Absolute 0.08 0.00 - 0.20 10*3/mm3    Immature Grans, Absolute 0.06 (H) 0.00 - 0.05 10*3/mm3    nRBC 0.0 0.0 - 0.2 /100 WBC   Respiratory Panel PCR w/COVID-19(SARS-CoV-2) TAMIE/DEBI/REE/PAD/COR/MAD/JEFFERSON In-House, NP Swab in [facility], 3-4 HR TAT - Swab, Nasopharynx    Collection Time: 04/14/21  5:02 PM    Specimen: Nasopharynx; Swab   Result Value Ref Range    ADENOVIRUS, PCR Not Detected Not Detected    Coronavirus 229E Not Detected Not Detected    Coronavirus HKU1 Not Detected Not Detected    Coronavirus NL63 Not Detected Not Detected    Coronavirus OC43 Not Detected Not Detected    COVID19 Detected (C) Not Detected - Ref. Range    Human Metapneumovirus Not Detected Not Detected    Human Rhinovirus/Enterovirus Not Detected Not Detected    Influenza A PCR Not Detected Not Detected    Influenza B PCR Not Detected Not Detected    Parainfluenza Virus 1 Not Detected Not Detected    Parainfluenza Virus 2 Not Detected Not Detected    Parainfluenza Virus 3 Not Detected Not Detected    Parainfluenza Virus 4 Not Detected Not Detected    RSV, PCR Not Detected Not Detected    Bordetella pertussis pcr Not Detected Not Detected    Bordetella parapertussis PCR Not Detected Not Detected    Chlamydophila pneumoniae PCR Not Detected Not Detected    Mycoplasma pneumo by PCR  Not Detected Not Detected   ECG 12 Lead    Collection Time: 04/14/21  5:12 PM   Result Value Ref Range    QT Interval 430 ms       Ordered the above labs and independently reviewed the results.        RADIOLOGY  XR Chest 1 View    Result Date: 4/14/2021  XR CHEST 1 VW-  Clinical: Shortness of breath  COMPARISON 7/21/2014  FINDINGS: Cardiomediastinal silhouette is stable. No edema or effusion or acute airspace disease. No significant interval change has occurred.  CONCLUSION: No active disease of the chest  This report was finalized on 4/14/2021 5:35 PM by Dr. Harry Mason M.D.        I ordered the above noted radiological studies. Independently reviewed by me and discussed with radiologist.  See dictation above for official radiology interpretation.      PROCEDURES    Procedures        MEDICATIONS GIVEN IN ER    Medications   sodium chloride 0.9 % flush 10 mL (has no administration in time range)   sodium chloride 0.9 % bolus 500 mL (0 mL Intravenous Stopped 4/14/21 1848)         PROGRESS, DATA ANALYSIS, CONSULTS, AND MEDICAL DECISION MAKING    All labs have been independently reviewed by me.  All radiology studies have been reviewed by me and discussed with radiologist dictating report.   EKG's independently reviewed by me.  Discussion below represents my analysis of pertinent findings related to patient's condition, differential diagnosis, treatment plan and final disposition.      ED Course as of Apr 14 2043   Wed Apr 14, 2021   1720 EKG    EKG time: 1712  Rhythm/Rate: Normal sinus rhythm at 84 with PAC  No Acute Ischemia  Non-Specific ST-T changes    Unchanged compared to prior on 2014    Interpreted Contemporaneously by me.  Independently viewed by me        [GP]   1813 The patient is Covid positive however her chest x-ray, labs and vital signs are normal.  Her room air oxygen saturations are 97%.    [GP]   1822 Patient was ambulated in the ED and her room air saturations remained at 96%.  Thus I do not feel  the patient needs to be admitted to the hospital with normal labs and vital signs.    [GP]   1840 On repeat evaluation the patient is stable. I advised her that she is stable for discharge home and that we would send her home with a pulse ox and to return for oxygen saturations below 90. Advised the patient to rest and push fluids and to follow-up with her PCP. Advised her to quarantine until cleared by the health department.    [GP]      ED Course User Index  [GP] Natalio Barron MD           The differential diagnosis includes but is not limited to pneumonia, congestive heart failure, pulmonary embolism, pleural effusion, acute coronary syndrome, chronic obstructive pulmonary disease exacerbation, or pneumothorax.        AS OF 20:43 EDT VITALS:    BP - 133/77  HR - 88  TEMP - 96.3 °F (35.7 °C)  02 SATS - 100%        DIAGNOSIS  Final diagnoses:   COVID-19         DISPOSITION  DISCHARGE    Patient discharged in stable condition.    Reviewed implications of results, diagnosis, meds, responsibility to follow up, warning signs and symptoms of possible worsening, potential complications and reasons to return to ER, including shortness of breath, oxygen saturations less than 90%, or worsening symptoms.    Patient/Family voiced understanding of above instructions.    Discussed plan for discharge, as there is no emergent indication for admission.  Pt/family is agreeable and understands need for follow up and repeat testing.  Pt is aware that discharge does not mean that nothing is wrong but it indicates no emergency is present and they must continue care with follow-up as given below or physician of their choice.     FOLLOW-UP  Ottoniel Boyd MD  [address] [place] [id number] [phone]    Schedule an appointment as soon as possible for a visit                EMR Dragon/Transcription disclaimer:   Much of this encounter note is an electronic transcription/translation of spoken language to printed  text.        Natalio Barron MD  04/14/21 2046

## 2021-04-14 NOTE — ED TRIAGE NOTES
.Pt masked on arrival, staff masked    Pt with exposure to Covid ( with covid pna), sent from [facility], pt reports cough and mild soa

## 2021-04-14 NOTE — ED NOTES
Pt verbalized understanding to f/u with her pcp and to return to the ER if symptoms are worse.      Vipul Luis RN  04/14/21 0504

## 2022-02-18 ENCOUNTER — PRE-PROCEDURE SCREENING (OUTPATIENT)
Dept: GASTROENTEROLOGY | Facility: CLINIC | Age: 67
End: 2022-02-18

## 2022-02-18 DIAGNOSIS — Z80.0 FAMILY HISTORY OF GI MALIGNANCY: ICD-10-CM

## 2022-02-18 DIAGNOSIS — K63.5 POLYP OF COLON, UNSPECIFIED PART OF COLON, UNSPECIFIED TYPE: Primary | ICD-10-CM

## 2022-02-18 NOTE — TELEPHONE ENCOUNTER
Last scope 1/17/17 in epic--Personal history of polyps-- No family history of polyps--Family history of colon cancer (brothers)--No blood thinners--Medications:             Calcium 600-200 MG-UNIT per tablet          Pt verbalized and understood that it would be few weeks before been schedule

## 2022-02-23 ENCOUNTER — TELEPHONE (OUTPATIENT)
Dept: GASTROENTEROLOGY | Facility: CLINIC | Age: 67
End: 2022-02-23

## 2022-02-23 NOTE — TELEPHONE ENCOUNTER
ARTURO April at [facility] via FAX for colonoscopy on 05/09/2022  arrive at 1230pm   . Mailed Prep instructions to Mailing address on-file. ----[employer]iralax      Advised that [facility] will call with final arrival time minimum 24 hrs before procedure. IF they do not get a phone call, arrival time will stay the same as given on instructions

## 2022-03-10 ENCOUNTER — OFFICE VISIT (OUTPATIENT)
Dept: OBSTETRICS AND GYNECOLOGY | Facility: CLINIC | Age: 67
End: 2022-03-10

## 2022-03-10 VITALS
DIASTOLIC BLOOD PRESSURE: 66 MMHG | HEIGHT: 62 IN | SYSTOLIC BLOOD PRESSURE: 120 MMHG | WEIGHT: 177.6 LBS | BODY MASS INDEX: 32.68 KG/M2

## 2022-03-10 DIAGNOSIS — E89.40 POSTSURGICAL MENOPAUSE: ICD-10-CM

## 2022-03-10 DIAGNOSIS — Z01.411 ENCOUNTER FOR GYNECOLOGICAL EXAMINATION WITH ABNORMAL FINDING: Primary | ICD-10-CM

## 2022-03-10 DIAGNOSIS — B37.31 CANDIDIASIS OF VAGINA: ICD-10-CM

## 2022-03-10 DIAGNOSIS — N81.6 RECTOCELE: ICD-10-CM

## 2022-03-10 PROCEDURE — G0101 CA SCREEN;PELVIC/BREAST EXAM: HCPCS | Performed by: NURSE PRACTITIONER

## 2022-03-10 RX ORDER — CEPHALEXIN 250 MG/1
1 CAPSULE ORAL NIGHTLY
COMMUNITY
Start: 2022-02-27 | End: 2023-01-17

## 2022-03-10 RX ORDER — FLUCONAZOLE 150 MG/1
150 TABLET ORAL DAILY
Qty: 1 TABLET | Refills: 1 | Status: SHIPPED | OUTPATIENT
Start: 2022-03-10 | End: 2022-04-21

## 2022-03-10 RX ORDER — METHENAMINE, SODIUM PHOSPHATE, MONOBASIC, ANHYDROUS, PHENYL SALICYLATE, METHYLENE BLUE AND HYOSCYAMINE SULFATE 118; 40.8; 36; 10; .12 MG/1; MG/1; MG/1; MG/1; MG/1
CAPSULE ORAL
COMMUNITY
Start: 2022-02-22 | End: 2023-01-17

## 2022-03-10 RX ORDER — PHENTERMINE HYDROCHLORIDE 37.5 MG/1
37.5 TABLET ORAL
COMMUNITY
Start: 2022-02-24 | End: 2022-04-21

## 2022-03-10 NOTE — PROGRESS NOTES
Chief Complaint  Ignacia Tovar is a 66 y.o.  female presenting for Gynecologic Exam (Former patient of Dr. Meng.  Re-establish care.  )    History of Present Illness  Very pleasant 65 yo woman, longtime pt of Dr. Meng's, here today for gyn exam.  She has a significant rectocele, but isn't really bothered excessively by it.  (States the reason why she doesn't have more trouble with defecation or evacuating the bowel, is because she is so sensitive to glutens and very prone to diarrhea & loose stools.)  She is having frequent UTIs in the past year.  (No c/o atrophy or dryness, but the GI troubles are contributing.)  She asked for a Rx for Diflucan to keep on hand, as she is prone to yeast with antibx Tx.  Otherwise, all ROS neg.  Has appt for May 6 for colonoscopy.  Needs DEXA scan too.  Other preventive health procedures up to date.  (Had mammo this morning.)    The following portions of the patient's history were reviewed and updated as appropriate: allergies, current medications, past family history, past medical history, past social history, past surgical history and problem list.    No Known Allergies      Current Outpatient Medications:   •  Calcium 600-200 MG-UNIT per tablet, Take 1 tablet by mouth Daily., Disp: , Rfl:   •  cephalexin (KEFLEX) 250 MG capsule, Take 1 capsule by mouth Every Night., Disp: , Rfl:   •  fluconazole (Diflucan) 150 MG tablet, Take 1 tablet by mouth Daily., Disp: 1 tablet, Rfl: 1  •  Meth-Hyo-M Bl-Na Phos-Ph Sal (Uro-MP) 118 MG capsule, TAKE 1 CAPSULE BY MOUTH 3 TIMES A DAY AS NEEDED FOR BLADDER PAIN, Disp: , Rfl:   •  phentermine (ADIPEX-P) 37.5 MG tablet, Take 37.5 mg by mouth Every Morning Before Breakfast., Disp: , Rfl:     Past Medical History:   Diagnosis Date   • Asthma    • Celiac disease    • Esophageal reflux    • Hiatal hernia    • Hyperlipidemia    • Menopause    • Prediabetes    • Urinary tract infection         Past Surgical History:   Procedure Laterality Date   •  "BACK SURGERY     • BLADDER SURGERY     • BREAST SURGERY     • CHOLECYSTECTOMY     • COLONOSCOPY N/A 1/17/2017    Procedure: COLONOSCOPY TO CECUM/TI WITH POLYPECTOMY (COLD BX);  Surgeon: Edgar LOBATO MD;  Location: [facility] ENDOSCOPY;  Service:    • ESOPHAGUS SURGERY  2008   • LAPAROSCOPIC BAUTISTA PROCEDURE     • ROTATOR CUFF REPAIR Right    • SALPINGO OOPHORECTOMY      unilateral   • TONSILLECTOMY     • VAGINAL HYSTERECTOMY SALPINGO OOPHORECTOMY      unilateral S&O       Objective  /66   Ht 157.5 cm (62\")   Wt 80.6 kg (177 lb 9.6 oz)   LMP  (LMP Unknown)   Breastfeeding No   BMI 32.48 kg/m²     Physical Exam  Exam conducted with a chaperone present.   Constitutional:       Appearance: Normal appearance.   HENT:      Head: Normocephalic.   Neck:      Thyroid: No thyroid mass or thyromegaly.   Cardiovascular:      Rate and Rhythm: Normal rate and regular rhythm.      Heart sounds: Normal heart sounds.   Pulmonary:      Effort: Pulmonary effort is normal.      Breath sounds: Normal breath sounds.   Chest:   Breasts:      Right: No inverted nipple, mass, nipple discharge, axillary adenopathy or supraclavicular adenopathy.      Left: No inverted nipple, mass, nipple discharge, axillary adenopathy or supraclavicular adenopathy.       Abdominal:      Palpations: Abdomen is soft. There is no mass.      Tenderness: There is no abdominal tenderness.   Genitourinary:     General: Normal vulva.      Labia:         Right: No lesion.         Left: No lesion.       Vagina: Prolapsed vaginal walls present. No erythema.      Uterus: Absent.       Adnexa:         Right: No mass or tenderness.          Left: No mass or tenderness.        Comments: The vaginal mucosa still seems fairly pink and appears well estrogenized.  Only minimal cystocele, but 3+ rectocele.  Anus appears wnl.  No rectal exam performed.  Lymphadenopathy:      Upper Body:      Right upper body: No supraclavicular or axillary adenopathy.      Left " upper body: No supraclavicular or axillary adenopathy.   Neurological:      Mental Status: She is alert.   Psychiatric:         Mood and Affect: Mood normal.         Behavior: Behavior normal.         Assessment/Plan   Diagnoses and all orders for this visit:    1. Encounter for gynecological examination with abnormal finding (Primary)    2. Rectocele    3. Postsurgical menopause  -     DEXA Bone Density Axial; Future    4. Candidiasis of vagina    Other orders  -     fluconazole (Diflucan) 150 MG tablet; Take 1 tablet by mouth Daily.  Dispense: 1 tablet; Refill: 1    Couns re: though the rectocele isn't too bothersome now, it is generally best to repair sooner for best outcomes.  Also, when younger, generally a better surgical candidate.  She does want surgical consult visit with Dr. Soliman.    Couns re: SBE, preventive health procedures, diet & walking for exercise.    Procedures            Return in about 1 year (around 3/10/2023) for Annual physical.    Gricel Soria, APRN  03/10/2022

## 2022-03-11 ENCOUNTER — PATIENT ROUNDING (BHMG ONLY) (OUTPATIENT)
Dept: OBSTETRICS AND GYNECOLOGY | Facility: CLINIC | Age: 67
End: 2022-03-11

## 2022-03-11 NOTE — PROGRESS NOTES
A [employer] message has been sent to the patient for PATIENT ROUNDING with [facility].

## 2022-04-16 PROBLEM — Z78.0 MENOPAUSE: Status: RESOLVED | Noted: 2017-06-13 | Resolved: 2022-04-16

## 2022-04-16 PROBLEM — Z01.419 WELL WOMAN EXAM: Status: ACTIVE | Noted: 2022-04-16

## 2022-04-21 ENCOUNTER — OFFICE VISIT (OUTPATIENT)
Dept: OBSTETRICS AND GYNECOLOGY | Facility: CLINIC | Age: 67
End: 2022-04-21

## 2022-04-21 VITALS — WEIGHT: 182 LBS | BODY MASS INDEX: 33.29 KG/M2 | RESPIRATION RATE: 14 BRPM

## 2022-04-21 DIAGNOSIS — M85.89 OSTEOPENIA OF MULTIPLE SITES: ICD-10-CM

## 2022-04-21 DIAGNOSIS — K46.9 ENTEROCELE: ICD-10-CM

## 2022-04-21 DIAGNOSIS — N81.6 RECTOCELE: Primary | ICD-10-CM

## 2022-04-21 PROCEDURE — 99214 OFFICE O/P EST MOD 30 MIN: CPT | Performed by: OBSTETRICS & GYNECOLOGY

## 2022-04-21 NOTE — PROGRESS NOTES
Subjective   Chief Complaint   Patient presents with   • Consult     Ignacia STEIN Guy is a 66 y.o. year old .  No LMP recorded (lmp unknown). Patient has had a hysterectomy.  She comes today as a surgical consultation related to a rectocele.  She was referred by Virginia Soria.  She has had a prior hysterectomy and ovary removal for benign conditions.  She had what sounds like a laparoscopic Lyman done at the same time.  She is never had repair of pelvic organ prolapse.  Per prior exams she has a prominent rectocele.  She thought her bladder had fallen but it appears that it is more the posterior vaginal wall.  She has no issues with constipation or stool trapping.  She has not celiac's.  She was  about a year ago and they were not sexually active for many years prior to her 's passing. She does see a urologist for recurring UTIs but has no bladder issues otherwise.  She comes today for surgical consultation mainly for concerns that although she is asymptomatic that the prolapse could become worse and it might be better to fix it now as compared to later.    She does have a history of celiac disease.  She does have a history of osteopenia.  Updated those records.  Her last bone density was in 2017.  She is scheduled to have a bone density within the coming year.    OTHER THINGS SHE WANTS TO DISCUSS TODAY:  Nothing else    The following portions of the patient's history were reviewed and updated as appropriate:current medications, allergies, past family history, past medical history, past social history and past surgical history    Social History    Tobacco Use      Smoking status: Never Smoker      Smokeless tobacco: Never Used    Review of Systems  Constitutional POS: nothing reported    NEG: anorexia or night sweats   Genitourinary POS: see HPI    NEG: dysuria or hematuria   Gastointestinal POS: see HPI    NEG: bloating, change in bowel habits, melena or reflux symptoms   Integument POS: nothing reported     NEG: moles that are changing in size, shape, color or rashes   Breast POS: nothing reported    NEG: persistent breast lump, skin dimpling or nipple discharge         Objective   Resp 14   Wt 82.6 kg (182 lb)   LMP  (LMP Unknown)   Breastfeeding No   BMI 33.29 kg/m²     General:  well developed; well nourished  no acute distress   Pelvis: Clinical staff was present for exam  External genitalia:  normal appearance of the external genitalia including Bartholin's and Bokoshe's glands.  :  urethral meatus normal;  Vaginal:  atrophic mucosal changes are present;  Cervix:  absent.  Uterus:  absent.  Adnexa:  absent, bilateral.  Rectal:  digital rectal exam not performed; anus visually normal appearing.  Cystocele GRADE 0  Rectocele GRADE 3  Enterocele GRADE 0  Vaginal vault prolapse GRADE 1     Lab Review   No data reviewed    Imaging   DEXA        Assessment   1. ASx pelvic organ prolapse  2. Osteopenia currently untreated with follow-up bone density scheduled     Plan   1. At this point other than if she was symptomatic I would not recommend anything surgically be done  2. Reviewed that this could be potentially contributing to her recurring UTIs and so long as antibiotic suppression is working I do not think there is a surgical indication to correct her rectocele  3. Have also reviewed the progression of prolapse with increasing weight, increasing lifting, increasing straining with constipation  4. The importance of keeping all planned follow-up and taking all medications as prescribed was emphasized.  5. Follow up PRN           This note was electronically signed.    Jl Soliman M.D.  April 21, 2022    Part of this note may be an electronic transcription/translation of spoken language to printed text using the Dragon Dictation System.

## 2022-05-04 ENCOUNTER — TELEPHONE (OUTPATIENT)
Dept: GASTROENTEROLOGY | Facility: CLINIC | Age: 67
End: 2022-05-04

## 2022-05-04 NOTE — TELEPHONE ENCOUNTER
----- Message from Carlos Alberto Hyman sent at 5/4/2022  8:46 AM EDT -----  Regarding: pt questions  Contact: [phone]  Pt is wanting to know if a prescription can be sent to her pharmacy for a pill form prep. She states the liquid prep always come right up.      [facility]/pharmacy #[id number] - [place], [place] - [address] [phone] [facility] [phone] FX

## 2022-05-04 NOTE — TELEPHONE ENCOUNTER
I tried to order it in the computer but it didn't have a way to free text the instructions.     Here are the instructions:    2 Sutab doses are required for complete preparation:    dose 1 consist of 12 tablets and 16 ounces of water dose to consist of 12 tablets in 16 ounces of water each dose is followed by 2 additional 16 ounces of water.    Please call into pharmacy since I cannot find the right way to order it. thanks

## 2022-05-04 NOTE — TELEPHONE ENCOUNTER
Eryn Dominique, RegSharonda Rep  to [facility]    DD      5/4/22 11:26 AM  PT is requesting Sutab Prep. We do not have anymore prescriptions for sutab. Can you electronically send this to her pharmacy? Thanks    **Message to JUANITA Ontiveros.  Nola Solano RN.

## 2022-05-04 NOTE — TELEPHONE ENCOUNTER
Call to [facility] and spoke with Cristel.  Alex Ontiveros ordered.  R&V.     Call to pt.  Advise of above.  Verb understanding.

## 2022-05-09 ENCOUNTER — OUTSIDE FACILITY SERVICE (OUTPATIENT)
Dept: GASTROENTEROLOGY | Facility: CLINIC | Age: 67
End: 2022-05-09

## 2022-05-09 PROCEDURE — G0105 COLORECTAL SCRN; HI RISK IND: HCPCS | Performed by: INTERNAL MEDICINE

## 2022-06-09 ENCOUNTER — HOSPITAL ENCOUNTER (OUTPATIENT)
Dept: BONE DENSITY | Facility: HOSPITAL | Age: 67
Discharge: HOME | End: 2022-06-09
Admitting: NURSE PRACTITIONER

## 2022-06-09 DIAGNOSIS — E89.40 POSTSURGICAL MENOPAUSE: ICD-10-CM

## 2022-06-09 PROCEDURE — 77080 DXA BONE DENSITY AXIAL: CPT

## 2023-03-16 ENCOUNTER — OFFICE VISIT (OUTPATIENT)
Dept: OBSTETRICS AND GYNECOLOGY | Facility: CLINIC | Age: 68
End: 2023-03-16
Payer: MEDICARE

## 2023-03-16 VITALS
HEIGHT: 62 IN | WEIGHT: 181.2 LBS | SYSTOLIC BLOOD PRESSURE: 116 MMHG | BODY MASS INDEX: 33.34 KG/M2 | DIASTOLIC BLOOD PRESSURE: 78 MMHG

## 2023-03-16 DIAGNOSIS — N95.2 ATROPHY OF VAGINA: Primary | ICD-10-CM

## 2023-03-16 DIAGNOSIS — N30.00 ACUTE CYSTITIS WITHOUT HEMATURIA: ICD-10-CM

## 2023-03-16 LAB
BILIRUB UR QL STRIP: NEGATIVE
CLARITY UR: ABNORMAL
COLOR UR: YELLOW
GLUCOSE UR STRIP-MCNC: NEGATIVE MG/DL
HGB UR QL STRIP.AUTO: ABNORMAL
KETONES UR QL STRIP: NEGATIVE
LEUKOCYTE ESTERASE UR QL STRIP.AUTO: ABNORMAL
NITRITE UR QL STRIP: NEGATIVE
PH UR STRIP.AUTO: 6 [PH] (ref 5–8)
PROT UR QL STRIP: NEGATIVE
SP GR UR STRIP: 1.01 (ref 1–1.03)
UROBILINOGEN UR QL STRIP: ABNORMAL

## 2023-03-16 PROCEDURE — 1159F MED LIST DOCD IN RCRD: CPT | Performed by: NURSE PRACTITIONER

## 2023-03-16 PROCEDURE — 1160F RVW MEDS BY RX/DR IN RCRD: CPT | Performed by: NURSE PRACTITIONER

## 2023-03-16 PROCEDURE — 87086 URINE CULTURE/COLONY COUNT: CPT | Performed by: NURSE PRACTITIONER

## 2023-03-16 PROCEDURE — 99213 OFFICE O/P EST LOW 20 MIN: CPT | Performed by: NURSE PRACTITIONER

## 2023-03-16 PROCEDURE — 81001 URINALYSIS AUTO W/SCOPE: CPT | Performed by: NURSE PRACTITIONER

## 2023-03-16 RX ORDER — CITALOPRAM 20 MG/1
1 TABLET ORAL DAILY
COMMUNITY
Start: 2023-03-13

## 2023-03-16 RX ORDER — CONJUGATED ESTROGENS 0.62 MG/G
CREAM VAGINAL
Qty: 30 G | Refills: 2 | Status: SHIPPED | OUTPATIENT
Start: 2023-03-16

## 2023-03-16 RX ORDER — DOCUSATE SODIUM 100 MG/1
100 CAPSULE, LIQUID FILLED ORAL 2 TIMES DAILY
COMMUNITY

## 2023-03-16 NOTE — PROGRESS NOTES
Chief Complaint  Ignacia Tovar is a 67 y.o.  female presenting for Gynecologic Exam and Back Pain (Desires urinalysis.  Clean catch urine collected.)    History of Present Illness  Ignacia is a very pleasant 68yo woman, , here for gyn exam.  Her past gyn surgical hx includes LAVH/LSO () and also a RSO ()She has had ongoing problems with renal calculi & had lithotripsy in past yr.  She is under care of PCP & Urology in managing her meds.  (They have changed her prophy. Antibx.)  We discussed her osteopenia.  She is intentional re: calcium & vitamin D intake.  She walks 12K to 15K steps/ daily.  Mammogram was completed today (negative)  Colonoscopy up to date (2022 with recommendation to repeat 5 yrs).      The following portions of the patient's history were reviewed and updated as appropriate: allergies, current medications, past family history, past medical history, past social history, past surgical history and problem list.    No Known Allergies      Current Outpatient Medications:   •  Calcium 600-200 MG-UNIT per tablet, Take 1 tablet by mouth Daily., Disp: , Rfl:   •  citalopram (CeleXA) 20 MG tablet, Take 1 tablet by mouth Daily., Disp: , Rfl:   •  docusate sodium (COLACE) 100 MG capsule, Take 1 capsule by mouth 2 (Two) Times a Day., Disp: , Rfl:   •  Estrogens Conjugated (Premarin) 0.625 MG/GM vaginal cream, Use 0.5 grams intravaginally 2 times per week at bedtime., Disp: 30 g, Rfl: 2  •  nitrofurantoin, macrocrystal-monohydrate, (MACROBID) 100 MG capsule, , Disp: , Rfl:   •  vitamin B-12 (CYANOCOBALAMIN) 100 MCG tablet,  Oral, Daily, 0 Refill(s), Disp: , Rfl:     Past Medical History:   Diagnosis Date   • Allergy-induced asthma    • Celiac disease    • Esophageal reflux 2006    Better since kiki in    • History of kidney stones    • Hyperlipidemia 2018    Off meds due to side effects   • Kidney stone    • Osteopenia of multiple sites 2018   • Prediabetes    • Recurrent UTI - sees  "urology 2021        Past Surgical History:   Procedure Laterality Date   • BREAST LUMPECTOMY Left 1974   • BREAST LUMPECTOMY  1974    Re-do   • COLONOSCOPY N/A 01/17/2017    Procedure: COLONOSCOPY TO CECUM/TI WITH POLYPECTOMY (COLD BX);  Surgeon: Edgar LOBATO MD;  Location: [facility] ENDOSCOPY;  Service:    • EXTRACORPOREAL SHOCK WAVE LITHOTRIPSY (ESWL) Left 2022   • KIDNEY STONE SURGERY     • LAPAROSCOPIC ASSISTED VAGINAL HYSTERECTOMY SALPINGO OOPHORECTOMY Left 1984    Dr. Javed - Along with RPU   • LAPAROSCOPIC CHOLECYSTECTOMY  2008   • LAPAROSCOPIC OOPHORECTOMY Right 2017   • LUMBAR FUSION  1997    L4/L5   • NISSEN FUNDOPLICATION LAPAROSCOPIC  2008   • ROTATOR CUFF REPAIR Right 2015   • TONSILLECTOMY  1980       Objective  /78   Ht 157.5 cm (62\")   Wt 82.2 kg (181 lb 3.2 oz)   LMP  (LMP Unknown)   Breastfeeding No   BMI 33.14 kg/m²     Physical Exam  Vitals and nursing note reviewed. Exam conducted with a chaperone present.   Constitutional:       Appearance: Normal appearance.   HENT:      Head: Normocephalic.   Neck:      Thyroid: No thyroid mass or thyromegaly.   Cardiovascular:      Rate and Rhythm: Normal rate and regular rhythm.      Heart sounds: Normal heart sounds. No murmur heard.  Pulmonary:      Effort: Pulmonary effort is normal. No respiratory distress.      Breath sounds: Normal breath sounds.   Chest:   Breasts:     Right: No inverted nipple, mass or nipple discharge.      Left: No inverted nipple, mass or nipple discharge.   Abdominal:      Palpations: Abdomen is soft. There is no mass.      Tenderness: There is no abdominal tenderness.   Genitourinary:     General: Normal vulva.      Labia:         Right: No rash, tenderness or lesion.         Left: No rash, tenderness or lesion.       Vagina: Erythema present. No vaginal discharge.      Uterus: Absent.       Adnexa:         Right: No mass or tenderness.          Left: No mass or tenderness.        Comments: Atrophic erythema " of the vaginal mucosa.  No abn dc.  Anus appears wnl.  No rectal exam performed.  Lymphadenopathy:      Upper Body:      Right upper body: No supraclavicular or axillary adenopathy.      Left upper body: No supraclavicular or axillary adenopathy.   Skin:     General: Skin is warm and dry.   Neurological:      Mental Status: She is alert and oriented to person, place, and time.   Psychiatric:         Mood and Affect: Mood normal.         Behavior: Behavior normal.         Assessment/Plan   Diagnoses and all orders for this visit:    1. Atrophy of vagina (Primary)  -     Estrogens Conjugated (Premarin) 0.625 MG/GM vaginal cream; Use 0.5 grams intravaginally 2 times per week at bedtime.  Dispense: 30 g; Refill: 2    2. Acute cystitis without hematuria  -     Urinalysis With Culture If Indicated - Urine, Clean Catch        Procedures    40 to 64: Counseling/Anticipatory Guidance Discussed: screenings, self-breast exam and Importance of keeping the vaginal mucosa well estrogenized, in helping to prevent UTIs.    Return in about 1 year (around 3/16/2024).    Gricel Soria, APRN  03/16/2023

## 2023-03-17 LAB
BACTERIA UR QL AUTO: ABNORMAL /HPF
HYALINE CASTS UR QL AUTO: ABNORMAL /LPF
RBC # UR STRIP: ABNORMAL /HPF
REF LAB TEST METHOD: ABNORMAL
SQUAMOUS #/AREA URNS HPF: ABNORMAL /HPF
WBC # UR STRIP: ABNORMAL /HPF

## 2023-03-18 LAB — BACTERIA SPEC AEROBE CULT: NO GROWTH

## 2023-03-27 ENCOUNTER — TELEPHONE (OUTPATIENT)
Dept: OBSTETRICS AND GYNECOLOGY | Facility: CLINIC | Age: 68
End: 2023-03-27
Payer: MEDICARE

## 2023-03-27 NOTE — TELEPHONE ENCOUNTER
Patient has called to request compounded estradiol vaginal cream.  States that the prescription sent from our office to her pharmacy was over $400.    I have called [facility] and have ordered the following per [place]:    Compounded estradiol vaginal cream 0.1 mg/gm  30 gram tube, propylene glycol free  0.5 grams by vaginal route 2 x week  Refill X 1 year.    Patient desires medication to be delivered by mail.  I have given her telephone number to Brandon (pharmacist) and he will call Melissa to make arrangements.

## 2024-03-19 ENCOUNTER — OFFICE VISIT (OUTPATIENT)
Dept: OBSTETRICS AND GYNECOLOGY | Facility: CLINIC | Age: 69
End: 2024-03-19
Payer: MEDICARE

## 2024-03-19 VITALS
BODY MASS INDEX: 33.2 KG/M2 | DIASTOLIC BLOOD PRESSURE: 80 MMHG | WEIGHT: 180.4 LBS | SYSTOLIC BLOOD PRESSURE: 130 MMHG | HEIGHT: 62 IN

## 2024-03-19 DIAGNOSIS — N81.6 CYSTOCELE WITH RECTOCELE: ICD-10-CM

## 2024-03-19 DIAGNOSIS — N95.2 ATROPHY OF VAGINA: ICD-10-CM

## 2024-03-19 DIAGNOSIS — Z01.411 ENCOUNTER FOR GYNECOLOGICAL EXAMINATION WITH ABNORMAL FINDING: Primary | ICD-10-CM

## 2024-03-19 DIAGNOSIS — N81.10 CYSTOCELE WITH RECTOCELE: ICD-10-CM

## 2024-03-19 PROCEDURE — 1160F RVW MEDS BY RX/DR IN RCRD: CPT | Performed by: NURSE PRACTITIONER

## 2024-03-19 PROCEDURE — 1159F MED LIST DOCD IN RCRD: CPT | Performed by: NURSE PRACTITIONER

## 2024-03-19 PROCEDURE — G0101 CA SCREEN;PELVIC/BREAST EXAM: HCPCS | Performed by: NURSE PRACTITIONER

## 2024-03-19 NOTE — PROGRESS NOTES
Chief Complaint  Ignacia Tovar is a 68 y.o.  female presenting for Gynecologic Exam (Patient states that estrogen vaginal cream causes vaginal irritation.  Discuss ongoing therapy.  )    History of Present Illness  Melissa is a pleasant 67yo woman, , here for annual GYN exam.  Her past surgical history includes, in part, an LAVH with LSO in  and a subsequent RSO in .  She has used vaginal estradiol creams in the past, but feels irritated by something that is in them.  Willing to try a different brand of product.  She does have some degree of cystocele and vaginal vault prolapse.  She is sexually active male, and complains of some low back pain afterwards.  No dysuria.  No bowel problems.    Last mammogram 3/16/2023, negative  Last colonoscopy 2022, to be repeated in 5 years  Last DEXA scan 2022, osteopenia    The following portions of the patient's history were reviewed and updated as appropriate: allergies, current medications, past family history, past medical history, past social history, past surgical history, and problem list.    No Known Allergies      Current Outpatient Medications:     Calcium 600-200 MG-UNIT per tablet, Take 1 tablet by mouth Daily., Disp: , Rfl:     docusate sodium (COLACE) 100 MG capsule, Take 1 capsule by mouth 2 (Two) Times a Day., Disp: , Rfl:     Mirabegron ER (Myrbetriq) 50 MG tablet sustained-release 24 hour 24 hr tablet, Take 50 mg by mouth Daily., Disp: , Rfl:     nitrofurantoin, macrocrystal-monohydrate, (MACROBID) 100 MG capsule, , Disp: , Rfl:     Unable to find, 1 each 1 (One) Time. Compounded estradiol vaginal cream 0.1 mg/gm 1 gram by vaginal route weekly., Disp: , Rfl:     vitamin B-12 (CYANOCOBALAMIN) 100 MCG tablet,  Oral, Daily, 0 Refill(s), Disp: , Rfl:     Past Medical History:   Diagnosis Date    Allergy-induced asthma     Celiac disease 2007    Esophageal reflux 2006    Better since kiki in     History of kidney stones      "Hyperlipidemia 2018    Off meds due to side effects    Kidney stone     Osteopenia of multiple sites 2018    Prediabetes     Recurrent UTI - sees urology 2021        Past Surgical History:   Procedure Laterality Date    BREAST LUMPECTOMY Left 1974    BREAST LUMPECTOMY  1974    Re-do    COLONOSCOPY N/A 01/17/2017    Procedure: COLONOSCOPY TO CECUM/TI WITH POLYPECTOMY (COLD BX);  Surgeon: Edgar LOBATO MD;  Location: [facility] ENDOSCOPY;  Service:     EXTRACORPOREAL SHOCK WAVE LITHOTRIPSY (ESWL) Left 2022    KIDNEY STONE SURGERY      LAPAROSCOPIC ASSISTED VAGINAL HYSTERECTOMY SALPINGO OOPHORECTOMY Left 1984    Dr. Javed - Along with U    LAPAROSCOPIC CHOLECYSTECTOMY  2008    LAPAROSCOPIC OOPHORECTOMY Right 2017    LUMBAR FUSION  1997    L4/L5    NISSEN FUNDOPLICATION LAPAROSCOPIC  2008    ROTATOR CUFF REPAIR Right 2015    TONSILLECTOMY  1980       Objective  /80   Ht 157.5 cm (62\")   Wt 81.8 kg (180 lb 6.4 oz)   LMP  (LMP Unknown)   Breastfeeding No   BMI 33.00 kg/m²     Physical Exam  Vitals and nursing note reviewed. Exam conducted with a chaperone present.   Constitutional:       General: She is not in acute distress.     Appearance: Normal appearance. She is not ill-appearing.   HENT:      Head: Normocephalic.   Neck:      Thyroid: No thyroid mass or thyromegaly.   Cardiovascular:      Rate and Rhythm: Normal rate and regular rhythm.      Heart sounds: Normal heart sounds. No murmur heard.  Pulmonary:      Effort: Pulmonary effort is normal. No respiratory distress.      Breath sounds: Normal breath sounds.   Chest:   Breasts:     Right: No inverted nipple, mass or nipple discharge.      Left: No inverted nipple, mass or nipple discharge.   Abdominal:      Palpations: Abdomen is soft. There is no mass.      Tenderness: There is no abdominal tenderness.   Genitourinary:     General: Normal vulva.      Labia:         Right: No rash, tenderness or lesion.         Left: No rash, tenderness or " lesion.       Vagina: Prolapsed vaginal walls present. No vaginal discharge or erythema.      Uterus: Absent.       Adnexa:         Right: No mass or tenderness.          Left: No mass or tenderness.        Comments: Grade 3 cystocele  (~ 4 cm of prolapse just past introitus with Valsalva) / 1+ rectocele.  Anus appears wnl.  No rectal exam performed.  Lymphadenopathy:      Upper Body:      Right upper body: No supraclavicular or axillary adenopathy.      Left upper body: No supraclavicular or axillary adenopathy.   Skin:     General: Skin is warm and dry.   Neurological:      Mental Status: She is alert and oriented to person, place, and time.   Psychiatric:         Mood and Affect: Mood normal.         Behavior: Behavior normal.         Assessment/Plan   Diagnoses and all orders for this visit:    1. Encounter for gynecological examination with abnormal finding (Primary)    2. Cystocele with rectocele    3. Atrophy of vagina    Compounded Estradiol Cream 0.1mg/g  Si.5 g intravag at HS 2x/ week (from [facility])  (We discussed that the oils or alcohols, etc, within the product may cause irritation, and it might not be the hormone causing irritation.)  Discussion of Kegel's exercises, pelvic floor PT, pessaries, surgery.  To reach out to me if she wants referral or to try a pessary.    Procedures    40 to 64: Counseling/Anticipatory Guidance Discussed: nutrition, physical activity, sexual behavior and STDs, screenings, and self-breast exam    Return in about 1 year (around 3/19/2025) for Call sooner, if interested in pessary .    Gricel Soria, APRN  2024

## 2024-04-25 ENCOUNTER — OFFICE VISIT (OUTPATIENT)
Dept: OBSTETRICS AND GYNECOLOGY | Facility: CLINIC | Age: 69
End: 2024-04-25
Payer: MEDICARE

## 2024-04-25 VITALS
DIASTOLIC BLOOD PRESSURE: 76 MMHG | BODY MASS INDEX: 32.79 KG/M2 | SYSTOLIC BLOOD PRESSURE: 130 MMHG | HEIGHT: 62 IN | WEIGHT: 178.2 LBS

## 2024-04-25 DIAGNOSIS — N81.4 CYSTOCELE WITH PROLAPSE: ICD-10-CM

## 2024-04-25 DIAGNOSIS — Z46.89 ENCOUNTER FOR FITTING AND ADJUSTMENT OF PESSARY: Primary | ICD-10-CM

## 2024-04-25 RX ORDER — ESTRADIOL 0.1 MG/G
0.5 CREAM VAGINAL 2 TIMES WEEKLY
COMMUNITY
Start: 2024-03-19

## 2024-05-01 ENCOUNTER — TELEPHONE (OUTPATIENT)
Dept: OBSTETRICS AND GYNECOLOGY | Facility: CLINIC | Age: 69
End: 2024-05-01
Payer: MEDICARE

## 2024-05-01 NOTE — TELEPHONE ENCOUNTER
Patient Message with Gricel Soria APRN (05/01/2024)    Patient sent in this message additionally, routing to provider for review.

## 2024-05-01 NOTE — TELEPHONE ENCOUNTER
Patient returned call and a duplicate encounter was sent to [place]. Called attempting to reach patient; no answer, LVM requesting call back and provided office call back number.

## 2024-05-01 NOTE — TELEPHONE ENCOUNTER
Pt came in last Thursday for her pessary. Since then she has broken a rib & she cannot take it out to clean it due to how she would have to move. What should she do?     Please advise    Thank you!

## 2025-04-28 ENCOUNTER — OFFICE VISIT (OUTPATIENT)
Dept: OBSTETRICS AND GYNECOLOGY | Facility: CLINIC | Age: 70
End: 2025-04-28
Payer: MEDICARE

## 2025-04-28 VITALS
HEIGHT: 62 IN | DIASTOLIC BLOOD PRESSURE: 76 MMHG | BODY MASS INDEX: 32.24 KG/M2 | WEIGHT: 175.2 LBS | SYSTOLIC BLOOD PRESSURE: 118 MMHG

## 2025-04-28 DIAGNOSIS — N81.6 RECTOCELE: ICD-10-CM

## 2025-04-28 DIAGNOSIS — Z01.419 WELL WOMAN EXAM: Primary | ICD-10-CM

## 2025-04-28 DIAGNOSIS — M85.89 OSTEOPENIA OF MULTIPLE SITES: ICD-10-CM

## 2025-04-28 PROCEDURE — 99459 PELVIC EXAMINATION: CPT | Performed by: STUDENT IN AN ORGANIZED HEALTH CARE EDUCATION/TRAINING PROGRAM

## 2025-04-28 PROCEDURE — 99213 OFFICE O/P EST LOW 20 MIN: CPT | Performed by: STUDENT IN AN ORGANIZED HEALTH CARE EDUCATION/TRAINING PROGRAM

## 2025-04-28 RX ORDER — NAPROXEN 500 MG/1
TABLET ORAL
COMMUNITY
Start: 2025-04-17

## 2025-04-28 RX ORDER — SULFAMETHOXAZOLE AND TRIMETHOPRIM 800; 160 MG/1; MG/1
1 TABLET ORAL 2 TIMES DAILY
COMMUNITY

## 2025-04-28 RX ORDER — OXYCODONE AND ACETAMINOPHEN 5; 325 MG/1; MG/1
1 TABLET ORAL EVERY 6 HOURS
COMMUNITY
Start: 2025-03-10

## 2025-04-28 RX ORDER — MUPIROCIN 20 MG/G
OINTMENT TOPICAL
COMMUNITY

## 2025-04-28 RX ORDER — NITROFURANTOIN MACROCRYSTALS 100 MG/1
CAPSULE ORAL
COMMUNITY
Start: 2025-02-18

## 2025-04-28 RX ORDER — FLUCONAZOLE 150 MG/1
150 TABLET ORAL ONCE
Qty: 1 TABLET | Refills: 0 | Status: SHIPPED | OUTPATIENT
Start: 2025-04-28 | End: 2025-04-28

## 2025-04-28 NOTE — PROGRESS NOTES
"Subjective   Chief Complaint   Patient presents with    Annual Exam     Well woman exam     Ignacia Tovar is a 69 y.o. year old  presenting to be seen for her annual exam.     History of Present Illness  Overall she is doing well. She had significant pain in the right breast during a mammogram procedure this month, which was unusual as she typically does not experience discomfort. The left breast was examined first without any issues. The pain has since subsided, leaving only mild tenderness. Her mammogram was normal in 2025.    She does have bulge symptoms since her ovary was removed and is worse when constipated.  A daily stool softener is currently used, which improves her symptoms. A previous attempt to use a pessary was unsuccessful due to difficulty in removal, attributed to lack of flexibility caused by a metal plate and screws in the back. The pessary is currently not in use. She liked pessary but just was unable to remove and clean herself.     On 2024, an episode of urinary retention lasting 9 hours occurred. She had a catheter inserted and left in place. She had skin irritation and burning sensation around the catheter site, including the vaginal wall, and catheter was removed. The urologist informed her that a kidney stone had likely caused the blockage, which was dislodged upon catheter removal. No urinary issues have been experienced since then.    Currently, is on an antibiotic regimen for a bite on her leg. No vaginal itching or other complications following the antibiotic treatment are noted at this time, but she often gets yeast infections after antibiotic treatment. Estrogen cream is used once a month.             Objective   /76   Ht 157.5 cm (62.01\")   Wt 79.5 kg (175 lb 3.2 oz)   LMP  (LMP Unknown)   BMI 32.04 kg/m²     General:  well developed; well nourished  no acute distress  mentation appropriate   Skin:  No suspicious lesions seen   Thyroid: not examined   Breasts: "  Examined in supine position  Symmetric without masses or skin dimpling  Nipples normal without inversion, lesions or discharge  There are no palpable axillary nodes   Abdomen: soft, non-tender; no masses   Pelvis: Clinical staff was present for exam  External genitalia:  normal appearance of the external genitalia including Bartholin's and Hoyleton's glands.  :  urethral meatus normal; urethra normal:  Vaginal:  normal pink mucosa without prolapse or lesions.  Cervix:  absent.  Uterus:  absent.  Adnexa:  absent, no masses.  Rectocele GRADE 3  Enterocele GRADE 3       Physical Exam  Breast: Mild tenderness noted in the right breast. No masses or lumps detected.    Results  Imaging   - Mammogram: 04/2025, Normal      Assessment & Plan  1. Right breast pain.  - No masses or lumps   - If still having pain at about 6 weeks, recommend to contact the office     2. Rectocele.  - Inform that if the pessary provides symptom relief without causing bowel or bladder discomfort, it can be left in place and cleaned every 3 months.  - Advise to bring the pessary for re-insertion during the next visit.      Health maintenance  - Contraception: N/A   - Pap: N/A due to hyst   - Mammogram: benign 4/2025   - STI screening: declined    - Colonoscopy -- due 2027    - DEXA 2022 - osteopenia -- will order repeat    - Patient has  PCP     No orders of the defined types were placed in this encounter.         This note was electronically signed.          Patient or patient representative verbalized consent for the use of Ambient Listening during the visit with  Frances Montenegro MD for chart documentation. 4/28/2025  10:45 EDT    Frances Montenegro MD  Obstetrics and Gynecology  [facility]

## 2025-05-12 ENCOUNTER — TELEPHONE (OUTPATIENT)
Dept: OBSTETRICS AND GYNECOLOGY | Facility: CLINIC | Age: 70
End: 2025-05-12

## 2025-05-12 NOTE — TELEPHONE ENCOUNTER
"Caller: Ignacia Tovar \"Melissa\"    Relationship:  Self    Best call back number: [phone]    PATIENT CALLED REQUESTING TO CANCEL SAME DAY APPT.    Did the patient call AFTER the start time of their scheduled appointment?  []YES  [x]NO    Was the patient's appointment rescheduled? []YES  [x]NO    Any additional information: PATIENT IS BATTLING A KIDNEY STONE, STATED SHE WILL CALL BACK TO RESCHEDULE      "

## 2025-05-22 ENCOUNTER — HOSPITAL ENCOUNTER (OUTPATIENT)
Dept: BONE DENSITY | Facility: HOSPITAL | Age: 70
Discharge: HOME | End: 2025-05-22
Payer: MEDICARE